# Patient Record
(demographics unavailable — no encounter records)

---

## 2025-01-15 NOTE — REASON FOR VISIT
[Consultation] : a consultation visit [Spouse] : spouse [FreeTextEntry1] : NP - Ref by Dr. Huff for Pancreatic cyst & 10mm Polyp

## 2025-01-15 NOTE — ASSESSMENT
[FreeTextEntry1] : 65yoM  Pmhx: HTN, DM, CAD with Stents (on ASA). referred today by Dr. Blanchard for EMR of polyps.   #polypoid lesion of sigmoid colon -Will schedule patient for colonoscopy with EMR -Risks vs. benefits discussed -Educated on generic suprep and dulocolax -Hold ozempic 2 weeks prior to procedure  #H. Pylori -Will assess for eradication by Stool test   #Pancreatic Cyst -Will order MRI

## 2025-01-15 NOTE — PHYSICAL EXAM

## 2025-01-15 NOTE — HISTORY OF PRESENT ILLNESS
[FreeTextEntry1] : 65yoM  Pmhx: HTN, DM, CAD with Stents (on ASA). referred today by Dr. Blanchard for EMR of polyps.   Patient referred for a polypoid lesion in the sigmoid colon, 22cm proximal to the anus, tubulovillous adenoma. as well as pancreatic cysts. Patient denies any abdominal pain, nausea, vomiting, dysphagia, heart burn, unintentional weight loss, diarrhea, constipation, melena, hematochezia.   Reviewed outside medical records for previous EGD and colonoscopy as well as CT scan.    CT Abdomen Pelvis w/ IV Contrast 11/2024 showed hepatic steatosis, multiple pancreatic cysts measuring up to 1.5cm.

## 2025-02-26 NOTE — REASON FOR VISIT
[Post-op/Procedure: _________] : a [unfilled] post-op/procedure visit [Spouse] : spouse [Family Member] : family member

## 2025-02-26 NOTE — PHYSICAL EXAM

## 2025-02-27 NOTE — ASSESSMENT
[FreeTextEntry1] :         Polypoid lesion of sigmoid colon -Will schedule patient for colonoscopy with EMR -Risks vs. benefits discussed -Educated on generic suprep and dulocolax -Hold ozempic 2 weeks prior to procedure  H. Pylori -Will assess for eradication by Stool test   Pancreatic Cyst -Will order MRI

## 2025-03-26 NOTE — ASSESSMENT
[FreeTextEntry1] : JAMES VOGEL  is a pleasant 65 year old man  who came in 03/20/2025 for colon cancer. He has Dr SASCHA PORTILLO as His PCP.   he had in october 2024 first screening colonoscopy after positive cologaurd.in june 2024, he had clip in splenic flexure mri 2/12/2025 panc cyst he has 3 polyps , two were removed and came back as negative he sigmoid polyp that was sent to dr Alcaraz for EMR, pathology showed focus of carcinoma and positive margins for adenomatous changes, spoke to dr Alcaraz who recommended colonic resection.  family hx: no cancer no personal hx of cancer  longstanding goiter , benign biopsy  his cardiologist in [facility] dr Escalera cleared him   will send for cea and ct chest abdomen and pelvis   will offer him robotic sigmoidectomy sending for staging CT CAP  the above plan of care with discussed in details to the patient and all questions were answered to patient satisfaction. patient instructed to follow up with the referring physician and patient primary care provider   A total of 70 minutes was spent on this visit, obtaining h/p,  reviewing previous notes/imaging, counseling the patient on coming procedure and f/u , ordering tests (below), and documenting the findings in the note.

## 2025-03-26 NOTE — HISTORY OF PRESENT ILLNESS
[de-identified] : JAMES VOGEL  is a pleasant 65 year old man  who came in 03/20/2025 for colon cancer. He has Dr SASCHA PORTILLO as His PCP.   he had in october 2024 first screening colonoscopy after positive cologaurd.in june 2024, he had clip in splenic flexure mri 2/12/2025 panc cyst he has 3 polyps , two were removed and came back as negative he sigmoid polyp that was sent to dr Alcaraz for EMR, pathology showed focus of carcinoma and positive margins for adenomatous changes, spoke to dr Alcaraz who recommended colonic resection.  family hx: no cancer no personal hx of cancer  longstanding goiter , benign biopsy  his cardiologist in [facility] dr Escalera cleared him

## 2025-03-26 NOTE — ASSESSMENT
[FreeTextEntry1] : JAMES VOGEL  is a pleasant 65 year old man  who came in 03/20/2025 for colon cancer. He has Dr SASCHA PORTILLO as His PCP.   he had in october 2024 first screening colonoscopy after positive cologaurd.in june 2024, he had clip in splenic flexure mri 2/12/2025 panc cyst he has 3 polyps , two were removed and came back as negative he sigmoid polyp that was sent to dr Alcaraz for EMR, pathology showed focus of carcinoma and positive margins for adenomatous changes, spoke to dr Alcaraz who recommended colonic resection.  family hx: no cancer no personal hx of cancer  longstanding goiter , benign biopsy  his cardiologist in [facility] dr Escalera cleared him   will send for cea and ct chest abdomen and pelvis   will offer him robotic sigmoidectomy sending for staging CT CAP  the above plan of care with discussed in details to the patient and all questions were answered to patient satisfaction. patient instructed to follow up with the referring physician and patient primary care provider   A total of 70 minutes was spent on this visit, obtaining h/p,  reviewing previous notes/imaging, counseling the patient on coming procedure and f/u , ordering tests (below), and documenting the findings in the note.

## 2025-03-26 NOTE — PHYSICAL EXAM
[Normal Neck Lymph Nodes] : normal neck lymph nodes  [Normal Supraclavicular Lymph Nodes] : normal supraclavicular lymph nodes [Normal Groin Lymph Nodes] : normal groin lymph nodes [Normal Axillary Lymph Nodes] : normal axillary lymph nodes [Normal] : oriented to person, place and time, with appropriate affect [de-identified] : goiter

## 2025-03-26 NOTE — PHYSICAL EXAM
[Normal Neck Lymph Nodes] : normal neck lymph nodes  [Normal Supraclavicular Lymph Nodes] : normal supraclavicular lymph nodes [Normal Groin Lymph Nodes] : normal groin lymph nodes [Normal Axillary Lymph Nodes] : normal axillary lymph nodes [Normal] : oriented to person, place and time, with appropriate affect [de-identified] : goiter

## 2025-03-26 NOTE — HISTORY OF PRESENT ILLNESS
[de-identified] : JAMES VOGEL  is a pleasant 65 year old man  who came in 03/20/2025 for colon cancer. He has Dr SASCHA PORTILLO as His PCP.   he had in october 2024 first screening colonoscopy after positive cologaurd.in june 2024, he had clip in splenic flexure mri 2/12/2025 panc cyst he has 3 polyps , two were removed and came back as negative he sigmoid polyp that was sent to dr Alcaraz for EMR, pathology showed focus of carcinoma and positive margins for adenomatous changes, spoke to dr Alcaraz who recommended colonic resection.  family hx: no cancer no personal hx of cancer  longstanding goiter , benign biopsy  his cardiologist in [facility] dr Escalera cleared him

## 2025-04-21 NOTE — HISTORY OF PRESENT ILLNESS
[FreeTextEntry1] : Sunny Stewart is a 65 year old status post robotic assisted laparoscopic sigmoidectomy with Dr. Fontaine 4/2025. Postoperative, patient failed trial of void with 900 cc of urine in bladder.  Started on tamsulosin and discharged with urologic follow-up.  Patient reports that this is his first time seeing a urologist.  States that he felt that he was urinating without difficulty prior to surgery. Denies any fevers, dysuria.  Reports he is moving his bowels normally.  Case discussed with Dr. Fontaine today and patient had no bladder involvement during surgery. Consultation is for postoperative retention  Mild prostate enlargement on CT March 31, 2025.    Denies  PMH including previous urologic instrumentation, kidney stones, recurrent UTIs. Family History: No  malignancies

## 2025-04-21 NOTE — ASSESSMENT
[FreeTextEntry1] : Sunny Stewart is a 65 year old status post robotic assisted laparoscopic sigmoidectomy with Dr. Fontaine 4/2025. Postoperative, patient failed trial of void with 900 cc of urine in bladder.  Started on tamsulosin and discharged with urologic follow-up.  Goodman catheter without difficulty today.  Patient tolerated well. Return to office and ER precautions outlined.  Patient verbalized understanding. Recommend continue Flomax.  Side effect discussed and he is not having any at this time. Return to office 2 weeks with PA-C for bladder scan PVR.  Future will need PSA and urological follow-up with repeat pvr ~ 2-3 months assuming next visit he is voiding.

## 2025-04-21 NOTE — PHYSICAL EXAM
[General Appearance - In No Acute Distress] : no acute distress [] : no respiratory distress [No Focal Deficits] : no focal deficits [Oriented To Time, Place, And Person] : oriented to person, place, and time [de-identified] : Goodman catheter in place draining clear yellow urine in the catheter bag.

## 2025-04-21 NOTE — ADDENDUM
[FreeTextEntry1] : I, Dr. Paige, personally performed the evaluation and management (E/M) services for this established patient who presents today with (a) new problem(s)/exacerbation of (an) existing condition(s).  That E/M includes conducting the clinically appropriate interval history &/or exam, assessing all new/exacerbated conditions, and establishing a new plan of care.  Today, my ANGELA, Joseph Waldron, was here to observe my evaluation and management service for this new problem/exacerbated condition and follow the plan of care established by me going forward.

## 2025-04-21 NOTE — PHYSICAL EXAM
[General Appearance - In No Acute Distress] : no acute distress [] : no respiratory distress [No Focal Deficits] : no focal deficits [Oriented To Time, Place, And Person] : oriented to person, place, and time [de-identified] : Goodman catheter in place draining clear yellow urine in the catheter bag.

## 2025-05-01 NOTE — REASON FOR VISIT
[Family Member] : family member [de-identified] : robotic assisted sigmoidectomy [de-identified] : 4/14/25

## 2025-05-01 NOTE — HISTORY OF PRESENT ILLNESS
[de-identified] : JAMES VOGEL  is a pleasant 65 year old man  who came in 03/20/2025 for colon cancer. He has Dr SASCHA PORTILLO as His PCP.   he had in october 2024 first screening colonoscopy after positive cologaurd.in june 2024, he had clip in splenic flexure mri 2/12/2025 panc cyst he has 3 polyps , two were removed and came back as negative he sigmoid polyp that was sent to dr Alcaraz for EMR, pathology showed focus of carcinoma and positive margins for adenomatous changes, spoke to dr Alcaraz who recommended colonic resection.  family hx: no cancer no personal hx of cancer  longstanding goiter , benign biopsy  his cardiologist in [facility] dr Escalera cleared him  5/1/25 post op visit [Post-Op Complications] : No post-op complications reported

## 2025-05-01 NOTE — ASSESSMENT
[FreeTextEntry1] : JAMES VOGEL  is a pleasant 65 year old man  who came in 03/20/2025 for colon cancer. He has Dr SASCHA PORTILLO as His PCP.   he had in october 2024 first screening colonoscopy after positive cologaurd.in june 2024, he had clip in splenic flexure mri 2/12/2025 panc cyst he has 3 polyps , two were removed and came back as negative he sigmoid polyp that was sent to dr Alcaraz for EMR, pathology showed focus of carcinoma and positive margins for adenomatous changes, spoke to dr Alcaraz who recommended colonic resection.  family hx: no cancer no personal hx of cancer  longstanding goiter , benign biopsy  his cardiologist in [facility] dr Escalera cleared him   will send for cea and ct chest abdomen and pelvis   will offer him robotic sigmoidectomy sending for staging CT CAP  5/1/25 James returns today post robotic assisted sigmoidectomy on 4/14/25. Seen and examined with Dr. Fontaine. Abdomen soft non tender, non distended. Port sites healing well. Tolerating diet. Normal bowel function Path reviewed with patient- and wife ->  Final Diagnosis 1. Sigmoidectomy: - Portion of colon:  - Focal residual hyperplastic polypoid formation seen in prior surgical site, adjacent to prior tattooed area (see Comment). - Status post prior surgical site changes seen. - Prominent smooth muscles seen in colonic wall, no evidence of tumor seen. - No evidence of dayplsia seen. - No evidence of carcinoma seen.  - Surgical margins: - Negative for dayplsia. - Negative for carcinoma.  - One out of twenty-six lymph nodes, involved by metastatic carcinoma (1/26).  Plan-> Patient referred to medical oncology for treatment Will see in two months encouraged to call office with any questions or concerns  the above plan of care with discussed in details to the patient and all questions were answered to patient satisfaction. patient instructed to follow up with the referring physician and patient primary care provider   A total of  minutes was spent on this visit, obtaining h/p,  reviewing previous notes/imaging, counseling the patient on coming procedure and f/u , ordering tests (below), and documenting the findings in the note.

## 2025-05-01 NOTE — ASSESSMENT
[FreeTextEntry1] : JAMES VOGEL  is a pleasant 65 year old man  who came in 03/20/2025 for colon cancer. He has Dr SASCHA PORTILLO as His PCP.   he had in october 2024 first screening colonoscopy after positive cologaurd.in june 2024, he had clip in splenic flexure mri 2/12/2025 panc cyst he has 3 polyps , two were removed and came back as negative he sigmoid polyp that was sent to dr Alcaraz for EMR, pathology showed focus of carcinoma and positive margins for adenomatous changes, spoke to dr Alcaarz who recommended colonic resection.  family hx: no cancer no personal hx of cancer  longstanding goiter , benign biopsy  his cardiologist in [facility] dr Escalera cleared him   will send for cea and ct chest abdomen and pelvis   will offer him robotic sigmoidectomy sending for staging CT CAP  5/1/25 James returns today post robotic assisted sigmoidectomy on 4/14/25. Seen and examined with Dr. Fontaine. Abdomen soft non tender, non distended. Port sites healing well. Tolerating diet. Normal bowel function Path reviewed with patient- and wife ->  Final Diagnosis 1. Sigmoidectomy: - Portion of colon:  - Focal residual hyperplastic polypoid formation seen in prior surgical site, adjacent to prior tattooed area (see Comment). - Status post prior surgical site changes seen. - Prominent smooth muscles seen in colonic wall, no evidence of tumor seen. - No evidence of dayplsia seen. - No evidence of carcinoma seen.  - Surgical margins: - Negative for dayplsia. - Negative for carcinoma.  - One out of twenty-six lymph nodes, involved by metastatic carcinoma (1/26).  Plan-> Patient referred to medical oncology for treatment Will see in two months encouraged to call office with any questions or concerns  the above plan of care with discussed in details to the patient and all questions were answered to patient satisfaction. patient instructed to follow up with the referring physician and patient primary care provider   A total of  minutes was spent on this visit, obtaining h/p,  reviewing previous notes/imaging, counseling the patient on coming procedure and f/u , ordering tests (below), and documenting the findings in the note.

## 2025-05-01 NOTE — REASON FOR VISIT
[Family Member] : family member [de-identified] : robotic assisted sigmoidectomy [de-identified] : 4/14/25

## 2025-05-01 NOTE — HISTORY OF PRESENT ILLNESS
[de-identified] : JAMES VOGEL  is a pleasant 65 year old man  who came in 03/20/2025 for colon cancer. He has Dr SASCHA PORTILLO as His PCP.   he had in october 2024 first screening colonoscopy after positive cologaurd.in june 2024, he had clip in splenic flexure mri 2/12/2025 panc cyst he has 3 polyps , two were removed and came back as negative he sigmoid polyp that was sent to dr Alcaraz for EMR, pathology showed focus of carcinoma and positive margins for adenomatous changes, spoke to dr Alcaraz who recommended colonic resection.  family hx: no cancer no personal hx of cancer  longstanding goiter , benign biopsy  his cardiologist in [facility] dr Escalera cleared him  5/1/25 post op visit [Post-Op Complications] : No post-op complications reported

## 2025-05-08 NOTE — REVIEW OF SYSTEMS
Subjective:       Patient ID: Jocelyne Dickinson is a 55 y.o. female.    Chief Complaint: Anxiety (Feels anxiety is still severe. Current treatment not helping body tremors.)    Patient is here for medication follow-up.  Patient was started on Celexa 1 month ago, patient states the symptoms has not worsened and would like to continue on current medication.  Patient complaining of right ear pain x5 days.  Patient denies SI/HI at this time.  Review of Systems   Constitutional:  Positive for fatigue. Negative for chills and fever.   HENT:  Positive for ear pain and sore throat.    Eyes: Negative.    Respiratory: Negative.     Cardiovascular: Negative.  Negative for chest pain.   Gastrointestinal: Negative.    Endocrine: Negative.    Genitourinary: Negative.    Integumentary:  Negative.   Allergic/Immunologic: Negative.    Neurological:  Positive for tremors.   Hematological: Negative.    Psychiatric/Behavioral:  The patient is nervous/anxious.        Objective:      Physical Exam  Constitutional:       Appearance: Normal appearance.   HENT:      Head: Normocephalic.      Right Ear: External ear normal. A middle ear effusion is present.      Left Ear: Tympanic membrane, ear canal and external ear normal.      Nose: Nose normal.      Mouth/Throat:      Mouth: Mucous membranes are moist.      Pharynx: Posterior oropharyngeal erythema present.   Eyes:      Pupils: Pupils are equal, round, and reactive to light.   Cardiovascular:      Rate and Rhythm: Normal rate and regular rhythm.      Pulses: Normal pulses.      Heart sounds: Normal heart sounds.   Pulmonary:      Effort: Pulmonary effort is normal.      Breath sounds: Normal breath sounds.   Abdominal:      General: Abdomen is flat.      Palpations: Abdomen is soft.   Musculoskeletal:         General: Normal range of motion.      Cervical back: Normal range of motion and neck supple.   Skin:     General: Skin is warm.      Capillary Refill: Capillary refill takes less  than 2 seconds.   Neurological:      General: No focal deficit present.      Mental Status: She is alert and oriented to person, place, and time.   Psychiatric:         Attention and Perception: Attention normal.         Mood and Affect: Mood is anxious. Affect is tearful.         Speech: Speech normal.         Behavior: Behavior normal. Behavior is cooperative.         Thought Content: Thought content normal. Thought content does not include homicidal or suicidal ideation. Thought content does not include homicidal or suicidal plan.         Cognition and Memory: Cognition and memory normal.         Judgment: Judgment normal.       Assessment:       Problem List Items Addressed This Visit          Psychiatric    Anxiety and depression - Primary       ENT    Recurrent acute serous otitis media of right ear    Relevant Medications    amoxicillin (AMOXIL) 500 MG Tab       Cardiac/Vascular    Hyperlipidemia    Relevant Medications    atorvastatin (LIPITOR) 20 MG tablet   I spent a total of 15 minutes on the day of the visit.This includes face to face time and non-face to face time preparing to see the patient (eg, review of tests), obtaining and/or reviewing separately obtained history, documenting clinical information in the electronic or other health record, independently interpreting results and communicating results to the patient/family/caregiver, or care coordinator.     Plan:   Anxiety-continue current medication regimen, decrease life stressors, have healthy work/ life balance, patient to call clinic in 2 weeks if she decides to have psychiatrist referral  Right ear otitis media-continue Zyrtec daily as directed, amoxicillin sent to pharmacy on file, use as directed. Pain: Take OTC Tylenol or Motrin as needed per package instructions.   Keep Ear Canal Dry: Do not submerge head under water for the next week. Avoid excessive sweating. Avoid placing any foreign objects in the ear canal (cotton swabs, ear buds,  hearing aides, etc) until fully healed.    Hyperlipidemia-low-fat/low-cholesterol diet discussed with patient, patient instructed to increase physical activity to 30 minutes most days as tolerated, Lipitor increased from 10 mg p.o. q.day to 20 mg.  Return to clinic as needed         [Negative] : Heme/Lymph

## 2025-05-08 NOTE — PHYSICAL EXAM
[Normal] : normal appearance, no rash, nodules, vesicles, ulcers, erythema [Fully active, able to carry on all pre-disease performance without restriction] : Status 0 - Fully active, able to carry on all pre-disease performance without restriction

## 2025-05-08 NOTE — PHYSICAL EXAM
Jaxon Ambriz is a 55 year old male presenting with   Chief Complaint   Patient presents with    Office Visit    Follow-up     Sarcoidosis of lung        PAIN: How much pain have you had because of your arthritis/disease in the past week?    NO PAIN [] [] [] [] [] [] [x] [] [] [] [] SEVERE PAIN    0 1 2 3 4 5 6 7 8 9 10      DISEASE ACTIVITY:  Considering all the ways your arthritis/disease affects you?  VERY WELL [] [] [x] [] [] [] [] [] [] [] [] VERY POORLY    0 1 2 3 4 5 6 7 8 9 10      FATIGUE:  How much of a problem has unusual fatigue or tiredness been for you in the past week?    NO PROBLEM [] [] [] [] [x] [] [] [] [] [] [] MAJOR PROBLEM    0 1 2 3 4 5 6 7 8 9 10          There were no vitals taken for this visit.      Medications have been reviewed and updated    Denies known Latex allergy or symptoms of Latex sensitivity.  Tobacco use verified.    Health Maintenance Due   Topic Date Due    Hepatitis B Vaccine (1 of 3 - 3-dose series) Never done    COVID-19 Vaccine (6 - 2023-24 season) 09/01/2023    Medicare Advantage- Medicare Wellness Visit  01/01/2024       Patient would like communication of their results via:   [employer]    Immunization History   Administered Date(s) Administered    COVID Pfizer 12Y+ 04/13/2022    COVID Pfizer 12Y+ (Requires Dilution) 03/25/2021, 04/15/2021, 11/20/2021    COVID Pfizer Bivalent 12Y+ 03/20/2023    DPT 01/20/2009    Influenza, Unspecified Formulation 11/18/2009, 11/18/2015    Influenza, quadrivalent, multi-dose 11/01/2017, 02/08/2019, 11/15/2019    Influenza, quadrivalent, preserve-free 10/23/2020, 10/13/2021, 09/20/2022, 09/28/2023    Influenza, seasonal, injectable, trivalent 11/18/2009    Pneumococcal Conjugate 13 Valent Vacc (Prevnar 13) 08/15/2019    Pneumococcal Conjugate 20 Valent Vacc (Prevnar 20) 10/18/2023    Pneumococcal Polysaccharide Vacc (Pneumovax 23) 07/10/2018    Shingrix (Shingles Zoster) 04/13/2022, 10/18/2023    TD Adult, Adsorbed 08/21/1993     Td:Adult type tetanus/diphtheria 01/20/2009    Tdap 07/03/2015, 08/10/2016              [Normal] : normal appearance, no rash, nodules, vesicles, ulcers, erythema [Fully active, able to carry on all pre-disease performance without restriction] : Status 0 - Fully active, able to carry on all pre-disease performance without restriction

## 2025-05-13 NOTE — HISTORY OF PRESENT ILLNESS
[Disease: _____________________] : Disease: [unfilled] [T: ___] : T[unfilled] [N: ___] : N[unfilled] [M: ___] : M[unfilled] [AJCC Stage: ____] : AJCC Stage: [unfilled] [de-identified] : CC: I have colon cancer  He is here at the request of Dr. Fontaine  Patient is a 64 y/o male with a PMHx HTN, DM, CAD with Stents (on ASA) referred by Dr. Fontaine for the initial consultation of his colon cancer.  He had first screening colonoscopy in October 2024 after positive cologaurd in june 2024,  he was found have three polyps, two were removed and came back as negative. For the third polyp he was sent to dr Alcaraz for EMR, pathology showed -Invasive adenocarcinoma, well-differentiated and focally moderately differentiated, invading superficial submucosa (approximately 0.5 mm depth in submucosa, arising in background of tubulovillous adenoma pT1.  and positive margins for adenomatous changes.  Therefore, patient was referred for sigmoidectomy 4/14/25.   Pathology 4/14/25: -  Focal residual hyperplastic polypoid formation seen in prior surgical site, adjacent to prior tattooed area (see Comment). - Status post prior surgical site changes seen. - Prominent smooth muscles seen in colonic wall, no evidence of tumor seen. - One out of twenty-six lymph nodes, involved by metastatic carcinoma  Since surgery, he has recovered well. He denies any constipation, diarrhea or abdominal pain. Patient denies any chest pain, SOB, headache , palpitations.      He had MRI 2/12/2025 that showed cystic lesions throughout the pancreas largest being 1.5 cm in the body and 1.3 cm in the head. Follow up examination in 12 months recommended.   family hx: no cancer no personal hx of cancer  [de-identified] : Adenocarcinoma

## 2025-05-13 NOTE — BEGINNING OF VISIT
[0] : 2) Feeling down, depressed, or hopeless: Not at all (0) [PHQ-2 Negative] : PHQ-2 Negative [Former] : Former [> 15 Years] : > 15 Years [With Patient/Caregiver] : with Patient/Caregiver [[id number]] : 0

## 2025-05-13 NOTE — BEGINNING OF VISIT
[0] : 2) Feeling down, depressed, or hopeless: Not at all (0) [PHQ-2 Negative] : PHQ-2 Negative [Former] : Former [> 15 Years] : > 15 Years [With Patient/Caregiver] : with Patient/Caregiver [[id number]] : 0

## 2025-05-13 NOTE — ASSESSMENT
[FreeTextEntry1] : Patient is a 66 y/o male with a PMHx HTN, DM, CAD with Stents (on ASA) 2018 Patient referred for a polypoid lesion in the sigmoid colon, 22cm proximal to the anus, tubulovillous adenoma. as well as pancreatic cysts. Patient had Colonoscopy done. Pathology with Foci of Carcinoma.  # Stage IIIA Descending Colon Adenocarcinoma ([id number])  - Initial EMR showed Invasive adenocarcinoma, well-differentiated and focally moderately differentiated, invading superficial submucosa (approximately 0.5 mm depth in submucosa, arising in background of tubulovillous adenoma pT1.  and positive margins for adenomatous changes. - 4/14/25 sigmoidectomy: 1 out of 26 lymph nodes positive, negative margins, no high risk features  Recommendations:  We met Mr. Stewart today to discuss the adjuvant treatment for his stage IIIA colon adenocarcinoma. we went over the pathology and radiology findings. Patient has "low-risk" stage IIIA disease.  As per the NCCN guidelines, we recommended him adjuvant treatment with CAPOX or FOLFOX for 3 months vs single agent Capecitabine or 5-FU for 6 months. We went over the side effects of the medications. We gave him the written information about the chemo medications.   We offered him the enrollment into clinical trial that we is available at our hospital ([facility]). We went over the protocol with the patient and answered all her questions. First step before randomization is CtDNA testing. Depending on CtDNA test, he can be randomized to treatment vs (CtDNA) surveillance group. The trial allows one cycle of chemo prior to enrollment.  Patient needs time to decide about next step. We plan to regroup next week to discuss further treatment plan.   Blood work today: CBC, CMP, CEA, Iron studies, DPYD testing

## 2025-05-13 NOTE — HISTORY OF PRESENT ILLNESS
[Disease: _____________________] : Disease: [unfilled] [T: ___] : T[unfilled] [N: ___] : N[unfilled] [M: ___] : M[unfilled] [AJCC Stage: ____] : AJCC Stage: [unfilled] [de-identified] : CC: I have colon cancer  He is here at the request of Dr. Fontaine  Patient is a 64 y/o male with a PMHx HTN, DM, CAD with Stents (on ASA) referred by Dr. Fontaine for the initial consultation of his colon cancer.  He had first screening colonoscopy in October 2024 after positive cologaurd in june 2024,  he was found have three polyps, two were removed and came back as negative. For the third polyp he was sent to dr Alcaraz for EMR, pathology showed -Invasive adenocarcinoma, well-differentiated and focally moderately differentiated, invading superficial submucosa (approximately 0.5 mm depth in submucosa, arising in background of tubulovillous adenoma pT1.  and positive margins for adenomatous changes.  Therefore, patient was referred for sigmoidectomy 4/14/25.   Pathology 4/14/25: -  Focal residual hyperplastic polypoid formation seen in prior surgical site, adjacent to prior tattooed area (see Comment). - Status post prior surgical site changes seen. - Prominent smooth muscles seen in colonic wall, no evidence of tumor seen. - One out of twenty-six lymph nodes, involved by metastatic carcinoma  Since surgery, he has recovered well. He denies any constipation, diarrhea or abdominal pain. Patient denies any chest pain, SOB, headache , palpitations.      He had MRI 2/12/2025 that showed cystic lesions throughout the pancreas largest being 1.5 cm in the body and 1.3 cm in the head. Follow up examination in 12 months recommended.   family hx: no cancer no personal hx of cancer  [de-identified] : Adenocarcinoma

## 2025-05-13 NOTE — BEGINNING OF VISIT
[0] : 2) Feeling down, depressed, or hopeless: Not at all (0) [PHQ-2 Negative] : PHQ-2 Negative [Former] : Former [> 15 Years] : > 15 Years [With Patient/Caregiver] : with Patient/Caregiver [[id number]] : 0

## 2025-05-13 NOTE — END OF VISIT
[] : Fellow [FreeTextEntry3] : This is a 65-year-old male who was found to have stage III colon cancer who underwent a resection as above we explained that he is low risk stage IIIa disease we went over options including enrollment into the [facility] in our institution versus 3 months of adjuvant chemotherapy with Cape ox or FOLFOX.  We also explained the role of (CtDNA) for MRD status and monitoring and also explained that if this test is negative of trial I would still recommend 3 months of chemotherapy.  He wishes to think about what he wishes to do and will see us back next week to finalize treatment plan information about chemotherapy was provided in detail and side effects were discussed as well we also provided him with detailed information about the clinical trial.  He was hesitant about the trial because he did not feel comfortable being randomized [Time Spent: ___ minutes] : I have spent [unfilled] minutes of time on the encounter which excludes teaching and separately reported services.

## 2025-05-13 NOTE — BEGINNING OF VISIT
[0] : 2) Feeling down, depressed, or hopeless: Not at all (0) [PHQ-2 Negative] : PHQ-2 Negative [Former] : Former [> 15 Years] : > 15 Years [With Patient/Caregiver] : with Patient/Caregiver [[id number]] : 0

## 2025-05-13 NOTE — BEGINNING OF VISIT
[0] : 2) Feeling down, depressed, or hopeless: Not at all (0) [PHQ-2 Negative] : PHQ-2 Negative [Former] : Former [> 15 Years] : > 15 Years [With Patient/Caregiver] : with Patient/Caregiver [[id number]] : 0

## 2025-05-13 NOTE — ASSESSMENT
[FreeTextEntry1] : Patient is a 64 y/o male with a PMHx HTN, DM, CAD with Stents (on ASA) 2018 Patient referred for a polypoid lesion in the sigmoid colon, 22cm proximal to the anus, tubulovillous adenoma. as well as pancreatic cysts. Patient had Colonoscopy done. Pathology with Foci of Carcinoma.  # Stage IIIA Descending Colon Adenocarcinoma ([id number])  - Initial EMR showed Invasive adenocarcinoma, well-differentiated and focally moderately differentiated, invading superficial submucosa (approximately 0.5 mm depth in submucosa, arising in background of tubulovillous adenoma pT1.  and positive margins for adenomatous changes. - 4/14/25 sigmoidectomy: 1 out of 26 lymph nodes positive, negative margins, no high risk features  Recommendations:  We met Mr. Stewart today to discuss the adjuvant treatment for his stage IIIA colon adenocarcinoma. we went over the pathology and radiology findings. Patient has "low-risk" stage IIIA disease.  As per the NCCN guidelines, we recommended him adjuvant treatment with CAPOX or FOLFOX for 3 months vs single agent Capecitabine or 5-FU for 6 months. We went over the side effects of the medications. We gave him the written information about the chemo medications.   We offered him the enrollment into clinical trial that we is available at our hospital ([facility]). We went over the protocol with the patient and answered all her questions. First step before randomization is CtDNA testing. Depending on CtDNA test, he can be randomized to treatment vs (CtDNA) surveillance group. The trial allows one cycle of chemo prior to enrollment.  Patient needs time to decide about next step. We plan to regroup next week to discuss further treatment plan.   Blood work today: CBC, CMP, CEA, Iron studies, DPYD testing

## 2025-05-13 NOTE — ASSESSMENT
[FreeTextEntry1] : Patient is a 64 y/o male with a PMHx HTN, DM, CAD with Stents (on ASA) 2018 Patient referred for a polypoid lesion in the sigmoid colon, 22cm proximal to the anus, tubulovillous adenoma. as well as pancreatic cysts. Patient had Colonoscopy done. Pathology with Foci of Carcinoma.  # Stage IIIA Descending Colon Adenocarcinoma ([id number])  - Initial EMR showed Invasive adenocarcinoma, well-differentiated and focally moderately differentiated, invading superficial submucosa (approximately 0.5 mm depth in submucosa, arising in background of tubulovillous adenoma pT1.  and positive margins for adenomatous changes. - 4/14/25 sigmoidectomy: 1 out of 26 lymph nodes positive, negative margins, no high risk features  Recommendations:  We met Mr. Stewart today to discuss the adjuvant treatment for his stage IIIA colon adenocarcinoma. we went over the pathology and radiology findings. Patient has "low-risk" stage IIIA disease.  As per the NCCN guidelines, we recommended him adjuvant treatment with CAPOX or FOLFOX for 3 months vs single agent Capecitabine or 5-FU for 6 months. We went over the side effects of the medications. We gave him the written information about the chemo medications.   We offered him the enrollment into clinical trial that we is available at our hospital ([facility]). We went over the protocol with the patient and answered all her questions. First step before randomization is CtDNA testing. Depending on CtDNA test, he can be randomized to treatment vs (CtDNA) surveillance group. The trial allows one cycle of chemo prior to enrollment.  Patient needs time to decide about next step. We plan to regroup next week to discuss further treatment plan.   Blood work today: CBC, CMP, CEA, Iron studies, DPYD testing

## 2025-05-13 NOTE — ASSESSMENT
[FreeTextEntry1] : Patient is a 66 y/o male with a PMHx HTN, DM, CAD with Stents (on ASA) 2018 Patient referred for a polypoid lesion in the sigmoid colon, 22cm proximal to the anus, tubulovillous adenoma. as well as pancreatic cysts. Patient had Colonoscopy done. Pathology with Foci of Carcinoma.  # Stage IIIA Descending Colon Adenocarcinoma ([id number])  - Initial EMR showed Invasive adenocarcinoma, well-differentiated and focally moderately differentiated, invading superficial submucosa (approximately 0.5 mm depth in submucosa, arising in background of tubulovillous adenoma pT1.  and positive margins for adenomatous changes. - 4/14/25 sigmoidectomy: 1 out of 26 lymph nodes positive, negative margins, no high risk features  Recommendations:  We met Mr. Stewart today to discuss the adjuvant treatment for his stage IIIA colon adenocarcinoma. we went over the pathology and radiology findings. Patient has "low-risk" stage IIIA disease.  As per the NCCN guidelines, we recommended him adjuvant treatment with CAPOX or FOLFOX for 3 months vs single agent Capecitabine or 5-FU for 6 months. We went over the side effects of the medications. We gave him the written information about the chemo medications.   We offered him the enrollment into clinical trial that we is available at our hospital ([facility]). We went over the protocol with the patient and answered all her questions. First step before randomization is CtDNA testing. Depending on CtDNA test, he can be randomized to treatment vs (CtDNA) surveillance group. The trial allows one cycle of chemo prior to enrollment.  Patient needs time to decide about next step. We plan to regroup next week to discuss further treatment plan.   Blood work today: CBC, CMP, CEA, Iron studies, DPYD testing

## 2025-05-13 NOTE — HISTORY OF PRESENT ILLNESS
[Disease: _____________________] : Disease: [unfilled] [T: ___] : T[unfilled] [N: ___] : N[unfilled] [M: ___] : M[unfilled] [AJCC Stage: ____] : AJCC Stage: [unfilled] [de-identified] : CC: I have colon cancer  He is here at the request of Dr. Fontaine  Patient is a 66 y/o male with a PMHx HTN, DM, CAD with Stents (on ASA) referred by Dr. Fontaine for the initial consultation of his colon cancer.  He had first screening colonoscopy in October 2024 after positive cologaurd in june 2024,  he was found have three polyps, two were removed and came back as negative. For the third polyp he was sent to dr Alcaraz for EMR, pathology showed -Invasive adenocarcinoma, well-differentiated and focally moderately differentiated, invading superficial submucosa (approximately 0.5 mm depth in submucosa, arising in background of tubulovillous adenoma pT1.  and positive margins for adenomatous changes.  Therefore, patient was referred for sigmoidectomy 4/14/25.   Pathology 4/14/25: -  Focal residual hyperplastic polypoid formation seen in prior surgical site, adjacent to prior tattooed area (see Comment). - Status post prior surgical site changes seen. - Prominent smooth muscles seen in colonic wall, no evidence of tumor seen. - One out of twenty-six lymph nodes, involved by metastatic carcinoma  Since surgery, he has recovered well. He denies any constipation, diarrhea or abdominal pain. Patient denies any chest pain, SOB, headache , palpitations.      He had MRI 2/12/2025 that showed cystic lesions throughout the pancreas largest being 1.5 cm in the body and 1.3 cm in the head. Follow up examination in 12 months recommended.   family hx: no cancer no personal hx of cancer  [de-identified] : Adenocarcinoma

## 2025-05-13 NOTE — HISTORY OF PRESENT ILLNESS
[Disease: _____________________] : Disease: [unfilled] [T: ___] : T[unfilled] [N: ___] : N[unfilled] [M: ___] : M[unfilled] [AJCC Stage: ____] : AJCC Stage: [unfilled] [de-identified] : CC: I have colon cancer  He is here at the request of Dr. Fontaine  Patient is a 64 y/o male with a PMHx HTN, DM, CAD with Stents (on ASA) referred by Dr. Fontaine for the initial consultation of his colon cancer.  He had first screening colonoscopy in October 2024 after positive cologaurd in june 2024,  he was found have three polyps, two were removed and came back as negative. For the third polyp he was sent to dr Alcaraz for EMR, pathology showed -Invasive adenocarcinoma, well-differentiated and focally moderately differentiated, invading superficial submucosa (approximately 0.5 mm depth in submucosa, arising in background of tubulovillous adenoma pT1.  and positive margins for adenomatous changes.  Therefore, patient was referred for sigmoidectomy 4/14/25.   Pathology 4/14/25: -  Focal residual hyperplastic polypoid formation seen in prior surgical site, adjacent to prior tattooed area (see Comment). - Status post prior surgical site changes seen. - Prominent smooth muscles seen in colonic wall, no evidence of tumor seen. - One out of twenty-six lymph nodes, involved by metastatic carcinoma  Since surgery, he has recovered well. He denies any constipation, diarrhea or abdominal pain. Patient denies any chest pain, SOB, headache , palpitations.      He had MRI 2/12/2025 that showed cystic lesions throughout the pancreas largest being 1.5 cm in the body and 1.3 cm in the head. Follow up examination in 12 months recommended.   family hx: no cancer no personal hx of cancer  [de-identified] : Adenocarcinoma

## 2025-05-13 NOTE — HISTORY OF PRESENT ILLNESS
[Disease: _____________________] : Disease: [unfilled] [T: ___] : T[unfilled] [N: ___] : N[unfilled] [M: ___] : M[unfilled] [AJCC Stage: ____] : AJCC Stage: [unfilled] [de-identified] : CC: I have colon cancer  He is here at the request of Dr. Fontaine  Patient is a 66 y/o male with a PMHx HTN, DM, CAD with Stents (on ASA) referred by Dr. Fontaine for the initial consultation of his colon cancer.  He had first screening colonoscopy in October 2024 after positive cologaurd in june 2024,  he was found have three polyps, two were removed and came back as negative. For the third polyp he was sent to dr Alcaraz for EMR, pathology showed -Invasive adenocarcinoma, well-differentiated and focally moderately differentiated, invading superficial submucosa (approximately 0.5 mm depth in submucosa, arising in background of tubulovillous adenoma pT1.  and positive margins for adenomatous changes.  Therefore, patient was referred for sigmoidectomy 4/14/25.   Pathology 4/14/25: -  Focal residual hyperplastic polypoid formation seen in prior surgical site, adjacent to prior tattooed area (see Comment). - Status post prior surgical site changes seen. - Prominent smooth muscles seen in colonic wall, no evidence of tumor seen. - One out of twenty-six lymph nodes, involved by metastatic carcinoma  Since surgery, he has recovered well. He denies any constipation, diarrhea or abdominal pain. Patient denies any chest pain, SOB, headache , palpitations.      He had MRI 2/12/2025 that showed cystic lesions throughout the pancreas largest being 1.5 cm in the body and 1.3 cm in the head. Follow up examination in 12 months recommended.   family hx: no cancer no personal hx of cancer  [de-identified] : Adenocarcinoma

## 2025-05-13 NOTE — ASSESSMENT
[FreeTextEntry1] : Patient is a 66 y/o male with a PMHx HTN, DM, CAD with Stents (on ASA) 2018 Patient referred for a polypoid lesion in the sigmoid colon, 22cm proximal to the anus, tubulovillous adenoma. as well as pancreatic cysts. Patient had Colonoscopy done. Pathology with Foci of Carcinoma.  # Stage IIIA Descending Colon Adenocarcinoma ([id number])  - Initial EMR showed Invasive adenocarcinoma, well-differentiated and focally moderately differentiated, invading superficial submucosa (approximately 0.5 mm depth in submucosa, arising in background of tubulovillous adenoma pT1.  and positive margins for adenomatous changes. - 4/14/25 sigmoidectomy: 1 out of 26 lymph nodes positive, negative margins, no high risk features  Recommendations:  We met Mr. Stewart today to discuss the adjuvant treatment for his stage IIIA colon adenocarcinoma. we went over the pathology and radiology findings. Patient has "low-risk" stage IIIA disease.  As per the NCCN guidelines, we recommended him adjuvant treatment with CAPOX or FOLFOX for 3 months vs single agent Capecitabine or 5-FU for 6 months. We went over the side effects of the medications. We gave him the written information about the chemo medications.   We offered him the enrollment into clinical trial that we is available at our hospital ([facility]). We went over the protocol with the patient and answered all her questions. First step before randomization is CtDNA testing. Depending on CtDNA test, he can be randomized to treatment vs (CtDNA) surveillance group. The trial allows one cycle of chemo prior to enrollment.  Patient needs time to decide about next step. We plan to regroup next week to discuss further treatment plan.   Blood work today: CBC, CMP, CEA, Iron studies, DPYD testing

## 2025-07-08 NOTE — ASSESSMENT
[FreeTextEntry1] : Patient is a 66 y/o male with a PMHx HTN, DM, CAD with Stents (on ASA) 2018 Patient referred for a polypoid lesion in the sigmoid colon, 22cm proximal to the anus, tubulovillous adenoma. as well as pancreatic cysts. Patient had Colonoscopy done. Pathology with Foci of Carcinoma.  # Stage IIIA Descending Colon Adenocarcinoma ([id number])  - Initial EMR showed Invasive adenocarcinoma, well-differentiated and focally moderately differentiated, invading superficial submucosa (approximately 0.5 mm depth in submucosa, arising in background of tubulovillous adenoma pT1.  and positive margins for adenomatous changes. - 4/14/25 sigmoidectomy: 1 out of 26 lymph nodes positive, negative margins, no high risk features  Recommendations:  We met Mr. Stewart today to discuss the adjuvant treatment for his stage IIIA colon adenocarcinoma. we went over the pathology and radiology findings. Patient has "low-risk" stage IIIA disease.  As per the NCCN guidelines, we recommended him adjuvant treatment with CAPOX or FOLFOX for 3 months vs single agent Capecitabine or 5-FU for 6 months. We went over the side effects of the medications. We gave him the written information about the chemo medications.   We offered him the enrollment into clinical trial that we is available at our hospital ([facility]). We went over the protocol with the patient and answered all her questions. First step before randomization is CtDNA testing. Depending on CtDNA test, he can be randomized to treatment vs (CtDNA) surveillance group. The trial allows one cycle of chemo prior to enrollment.  Patient needs time to decide about next step. We plan to regroup next week to discuss further treatment plan.   Blood work today: CBC, CMP, CEA, Iron studies, DPYD testing  07/08/2025 : no new reported symptoms, exam is unchanged, s/p chemo capox, he still need two more cycles, will see him in to months with new CT CAP RIGHT NECK POSTERIOR TRIANGLE SWELLING TO BE DONE AFTER CHEMO,   HICCUP-> sending for UGI Goiter, sending for thyroid US and TSH the above plan of care with discussed in details to the patient and all questions were answered to patient satisfaction. patient instructed to follow up with the referring physician and patient primary care provider   A total of 45 minutes was spent on this visit, obtaining h/p,  reviewing previous notes/imaging, counseling the patient on  f/u , ordering tests (below), and documenting the findings in the note.

## 2025-07-08 NOTE — END OF VISIT
[] : Fellow [Time Spent: ___ minutes] : I have spent [unfilled] minutes of time on the encounter which excludes teaching and separately reported services. [FreeTextEntry3] : This is a 65-year-old male who was found to have stage III colon cancer who underwent a resection as above we explained that he is low risk stage IIIa disease we went over options including enrollment into the [facility] in our institution versus 3 months of adjuvant chemotherapy with Cape ox or FOLFOX.  We also explained the role of (CtDNA) for MRD status and monitoring and also explained that if this test is negative of trial I would still recommend 3 months of chemotherapy.  He wishes to think about what he wishes to do and will see us back next week to finalize treatment plan information about chemotherapy was provided in detail and side effects were discussed as well we also provided him with detailed information about the clinical trial.  He was hesitant about the trial because he did not feel comfortable being randomized

## 2025-07-08 NOTE — HISTORY OF PRESENT ILLNESS
[Disease: _____________________] : Disease: [unfilled] [T: ___] : T[unfilled] [N: ___] : N[unfilled] [M: ___] : M[unfilled] [AJCC Stage: ____] : AJCC Stage: [unfilled] [de-identified] : CC: I have colon cancer  He is here at the request of Dr. Fontaine  Patient is a 66 y/o male with a PMHx HTN, DM, CAD with Stents (on ASA) referred by Dr. Fontaine for the initial consultation of his colon cancer.  He had first screening colonoscopy in October 2024 after positive cologaurd in june 2024,  he was found have three polyps, two were removed and came back as negative. For the third polyp he was sent to dr Alcaraz for EMR, pathology showed -Invasive adenocarcinoma, well-differentiated and focally moderately differentiated, invading superficial submucosa (approximately 0.5 mm depth in submucosa, arising in background of tubulovillous adenoma pT1.  and positive margins for adenomatous changes.  Therefore, patient was referred for sigmoidectomy 4/14/25.   Pathology 4/14/25: -  Focal residual hyperplastic polypoid formation seen in prior surgical site, adjacent to prior tattooed area (see Comment). - Status post prior surgical site changes seen. - Prominent smooth muscles seen in colonic wall, no evidence of tumor seen. - One out of twenty-six lymph nodes, involved by metastatic carcinoma  Since surgery, he has recovered well. He denies any constipation, diarrhea or abdominal pain. Patient denies any chest pain, SOB, headache , palpitations.      He had MRI 2/12/2025 that showed cystic lesions throughout the pancreas largest being 1.5 cm in the body and 1.3 cm in the head. Follow up examination in 12 months recommended.   family hx: no cancer no personal hx of cancer  [de-identified] : Adenocarcinoma